# Patient Record
Sex: FEMALE | Race: WHITE | NOT HISPANIC OR LATINO | URBAN - METROPOLITAN AREA
[De-identification: names, ages, dates, MRNs, and addresses within clinical notes are randomized per-mention and may not be internally consistent; named-entity substitution may affect disease eponyms.]

---

## 2019-05-21 ENCOUNTER — EMERGENCY (EMERGENCY)
Facility: HOSPITAL | Age: 29
LOS: 1 days | Discharge: HOME | End: 2019-05-21
Attending: EMERGENCY MEDICINE | Admitting: EMERGENCY MEDICINE
Payer: COMMERCIAL

## 2019-05-21 VITALS
SYSTOLIC BLOOD PRESSURE: 147 MMHG | RESPIRATION RATE: 18 BRPM | HEART RATE: 114 BPM | OXYGEN SATURATION: 100 % | DIASTOLIC BLOOD PRESSURE: 100 MMHG | TEMPERATURE: 98 F

## 2019-05-21 LAB — HCG UR QL: NEGATIVE — SIGNIFICANT CHANGE UP

## 2019-05-21 PROCEDURE — 99284 EMERGENCY DEPT VISIT MOD MDM: CPT | Mod: 25

## 2019-05-21 PROCEDURE — 93010 ELECTROCARDIOGRAM REPORT: CPT

## 2019-05-21 RX ORDER — DIAZEPAM 5 MG
5 TABLET ORAL ONCE
Refills: 0 | Status: DISCONTINUED | OUTPATIENT
Start: 2019-05-21 | End: 2019-05-21

## 2019-05-21 RX ADMIN — Medication 5 MILLIGRAM(S): at 22:51

## 2019-05-21 NOTE — ED PROVIDER NOTE - NSFOLLOWUPINSTRUCTIONS_ED_ALL_ED_FT
RECOMMEND SEE PRIMARY FOR MEDICATIONS AS NEEDED FOR ANXIETY AND PANIC ATTACKS.  THEY ARE NOT LIFE THREATENING BUT FEEL LIFE THREATENING.  IF ASSOCIATED WITH FOCAL WEAKNESS OR PASSING OUT OR CHEST PAIN RETURN TO ER FOR RE-EVALUATION

## 2019-05-21 NOTE — ED PROVIDER NOTE - CHPI ED SYMPTOMS NEG
no chills/no vomiting/no dizziness/no fever/no CP, no SOB, no leg swelling/pain, no HA,no visual changes/no nausea

## 2019-05-21 NOTE — ED PROVIDER NOTE - CONSTITUTIONAL, MLM
normal... Well appearing, well nourished, awake, alert, oriented to person, place, time/situation and in no apparent distress. Seems anxious at the collected time of HPI. Well appearing, well nourished, awake, alert, oriented to person, place, time/situation and in no apparent distress. Seems anxious at the collected time of HPI. Sitting comfortably on the stretcher and able to give good hx Well appearing, well nourished, awake, alert, oriented to person, place, time/situation and in no apparent distress. Seems anxious. Sitting comfortably on the stretcher and able to give good hx

## 2019-05-21 NOTE — ED PROVIDER NOTE - PROGRESS NOTE DETAILS
comfortable relaxed no distress Patient feels much better and is ready to go home.  Will discharge with a few ativan but patient to follow with her primary. Patient feels much better and is ready to go home.  Will discharge with a few ativan but patient to follow with her primary. Discussed diagnosis, ED management, and home treatment today.  Patient given copy of results if any.  Patient/ family given opportunity to ask questions.  All questions answered.  Patient/family verbalized understanding of care today and follow-up needed.

## 2019-05-21 NOTE — ED PROVIDER NOTE - OBJECTIVE STATEMENT
28 y/o Female sitting comfortably on the stretcher and able to give good hx, BIBA after having a panic attack while watching GOT. States her left arm seized up and since she had 2 shoulder dislocation before, and so as a caution put on a arm sling. Then her fingers seized and went numb radiating to her left facial cheek for about 10 mins. Before the ambulance arrived, she had slightly calm down. Reports increased stress at work. No sick contact. Denies CP, SOB, fever, chills, leg swelling/pain, HA, dizziness, N/V and visual changes.    PMD: has one  PMHx: anxiety, no medication. Seen a naturopathic doctor.   PSHx:   Allergy: none  Soc:   - tob/ +etoh 30 y/o Female BIBA after having a panic attack while watching GOT. States her left arm seized up and since she had 2 shoulder dislocation before, and so as a caution put on a arm sling. Then her fingers seized and went numb radiating to her left facial cheek for about 10 mins. Before the ambulance arrived, she had slightly calm down. Reports increased stress at work. No sick contact. Denies CP, SOB, fever, chills, leg swelling/pain, HA, dizziness, N/V and visual changes.    PMD: has one  PMHx: anxiety, no medication. Seen a naturopathic doctor.   PSHx:   Allergy: none  Soc:   - tob/ +etoh 28 y/o Female BIBA after having a panic attack while watching a movie. States she felt muscles tightening to include left shoulder and arm.  Then noticed her fingers were tightening up b/l and had numbness radiating to face. Before the ambulance arrived, she had slightly calm down. Reports increased stress at work. No sick contact. Denies CP, SOB, fever, chills, leg swelling/pain, HA, dizziness, N/V and visual changes, or extremity weakness.    PMD: none  PMHx: anxiety, no medication. Seen a naturopathic doctor.   PSHx:   Allergy: none  Soc:   - tob/ +etoh

## 2019-05-21 NOTE — ED PROVIDER NOTE - CLINICAL SUMMARY MEDICAL DECISION MAKING FREE TEXT BOX
CC: panic attack, anxiety  DDx: new stress at work, untreated anxiety  Plan: Will give valium and reevaluate

## 2019-05-22 VITALS — DIASTOLIC BLOOD PRESSURE: 74 MMHG | HEART RATE: 90 BPM | SYSTOLIC BLOOD PRESSURE: 123 MMHG

## 2019-05-25 DIAGNOSIS — F41.9 ANXIETY DISORDER, UNSPECIFIED: ICD-10-CM

## 2019-05-25 DIAGNOSIS — F41.0 PANIC DISORDER [EPISODIC PAROXYSMAL ANXIETY]: ICD-10-CM

## 2019-05-25 DIAGNOSIS — R20.0 ANESTHESIA OF SKIN: ICD-10-CM

## 2021-03-01 NOTE — ED ADULT NURSE NOTE - NSFALLRSKOUTCOME_ED_ALL_ED
You can access the FollowMyHealth Patient Portal offered by [facility] by registering at the following website: [email or web address]. By joining [employer]’s FollowMyHealth portal, you will also be able to view your health information using other applications (apps) compatible with our system.
Universal Safety Interventions